# Patient Record
Sex: MALE | NOT HISPANIC OR LATINO | Employment: FULL TIME | URBAN - METROPOLITAN AREA
[De-identification: names, ages, dates, MRNs, and addresses within clinical notes are randomized per-mention and may not be internally consistent; named-entity substitution may affect disease eponyms.]

---

## 2022-06-30 ENCOUNTER — TELEPHONE (OUTPATIENT)
Dept: UROLOGY | Facility: CLINIC | Age: 32
End: 2022-06-30

## 2022-06-30 DIAGNOSIS — N46.9 MALE INFERTILITY: Primary | ICD-10-CM

## 2022-06-30 NOTE — TELEPHONE ENCOUNTER
[facility] Call Center    Phone Message    May a detailed message be left on voicemail: yes     Reason for Call: Order(s): Other:   Reason for requested: semen analysis  Date needed: before September 1  Provider name: Dereje      Action Taken: Other: Urology    Travel Screening: Not Applicable

## 2022-06-30 NOTE — TELEPHONE ENCOUNTER
Called patient and talked to him about the information below.     Before an appointment the patient should have the following competed.     Semen Analysis (Strict Morph). For the sample the patient needs to abstain from ejaculation for 2-5 days prior, and the sample should be collected in clinic. Ejaculating too frequently can deplete the count whereas abstaining for long periods of time can decrease the number of live sperm. No lubrication, powders, saliva, or intercourse can be used. Partners can be in the room and help produce the sample.   If this has already been completed it can be faxed to ([phone]).    [facility]  [facility]  [address]  [address] [place], [place] [address] [phone]      Lab work - Testosterone free and total, FSH (follicle stimulating hormone), and Estradiol ultra sensitive. This lab work can be done at any [facility] but must be drawn early in the morning.       Any previous OB/GYN office notes, urology office notes, or operative reports (varicocele, and testicular surgery or treatment). This can be faxed to ([phone]).

## 2022-08-04 NOTE — TELEPHONE ENCOUNTER
Sent letter with detailed information on scheduling for semen analysis and blood draw labs prior to visit on 9/14/22 with Dr. Reyez.     Abbey Santana  In-Clinic Visit Facilitator

## 2022-08-17 ENCOUNTER — LAB (OUTPATIENT)
Dept: LAB | Facility: CLINIC | Age: 32
End: 2022-08-17
Payer: COMMERCIAL

## 2022-08-17 DIAGNOSIS — N46.9 MALE INFERTILITY: ICD-10-CM

## 2022-08-17 LAB — FSH SERPL IRP2-ACNC: 4.1 MIU/ML (ref 1.5–12.4)

## 2022-08-17 PROCEDURE — 36415 COLL VENOUS BLD VENIPUNCTURE: CPT

## 2022-08-17 PROCEDURE — 84270 ASSAY OF SEX HORMONE GLOBUL: CPT

## 2022-08-17 PROCEDURE — 83001 ASSAY OF GONADOTROPIN (FSH): CPT

## 2022-08-17 PROCEDURE — 82670 ASSAY OF TOTAL ESTRADIOL: CPT

## 2022-08-17 PROCEDURE — 84403 ASSAY OF TOTAL TESTOSTERONE: CPT

## 2022-08-18 LAB — SHBG SERPL-SCNC: 13 NMOL/L (ref 11–80)

## 2022-08-19 LAB
TESTOST FREE SERPL-MCNC: 7.44 NG/DL
TESTOST SERPL-MCNC: 251 NG/DL (ref 240–950)

## 2022-08-31 ENCOUNTER — LAB (OUTPATIENT)
Dept: LAB | Facility: CLINIC | Age: 32
End: 2022-08-31
Payer: COMMERCIAL

## 2022-08-31 DIAGNOSIS — Z31.41 FERTILITY TESTING: Primary | ICD-10-CM

## 2022-08-31 DIAGNOSIS — Z31.41 FERTILITY TESTING: ICD-10-CM

## 2022-08-31 LAB — ESTRADIOL SERPL HS-MCNC: 13 PG/ML (ref 10–40)

## 2022-08-31 PROCEDURE — 89322 SEMEN ANAL STRICT CRITERIA: CPT

## 2022-09-01 LAB
ABNORMAL SPERM MORPHOLOGY: 100
ABSTINENCE DAYS: 4 DAYS (ref 2–7)
AGGLUTINATION: NO
ANALYSIS TEMP - CENTIGRADE: 22 CENTIGRADE
COLLECTION METHOD: ABNORMAL
COLLECTION SITE: ABNORMAL
CONSENT TO RELEASE TO PARTNER: YES
DAL- RECEIVED TIME: ABNORMAL
HEAD DEFECT: 100 %
IMMOTILE: 81 %
LIQUEFIED: YES
MIDPIECE DEFECT: 33 %
NON-PROGRESSIVE MOTILITY: 8 %
NORMAL SPERM MORPHOLOGY: 0 % NORMAL FORMS
PROGRESSIVE MOTILITY: 11 %
ROUND CELLS: 0.4 MILLION/ML
SPECIMEN PH: 7.2 PH
SPECIMEN VOLUME: 4.1 ML
SPERM CONCENTRATION: 0.8 MILLION/ML
TAIL DEFECT: 12 %
TIME OF ANALYSIS: ABNORMAL
TOTAL PROGRESSIVE MOTILE NUMBER: 0.33 MILLION
TOTAL SPERM NUMBER: 3 MILLION
VISCOUS: NO
VITALITY: ABNORMAL

## 2022-09-14 ENCOUNTER — OFFICE VISIT (OUTPATIENT)
Dept: UROLOGY | Facility: CLINIC | Age: 32
End: 2022-09-14
Payer: COMMERCIAL

## 2022-09-14 DIAGNOSIS — N46.9 MALE INFERTILITY: Primary | ICD-10-CM

## 2022-09-14 LAB
LH SERPL-ACNC: 5.4 MIU/ML (ref 1.7–8.6)
PROLACTIN SERPL 3RD IS-MCNC: 6 NG/ML (ref 4–15)

## 2022-09-14 PROCEDURE — 84270 ASSAY OF SEX HORMONE GLOBUL: CPT | Performed by: UROLOGY

## 2022-09-14 PROCEDURE — 36415 COLL VENOUS BLD VENIPUNCTURE: CPT | Performed by: UROLOGY

## 2022-09-14 PROCEDURE — 84403 ASSAY OF TOTAL TESTOSTERONE: CPT | Performed by: UROLOGY

## 2022-09-14 PROCEDURE — 84146 ASSAY OF PROLACTIN: CPT | Performed by: UROLOGY

## 2022-09-14 PROCEDURE — 83002 ASSAY OF GONADOTROPIN (LH): CPT | Performed by: UROLOGY

## 2022-09-14 PROCEDURE — 99213 OFFICE O/P EST LOW 20 MIN: CPT | Performed by: UROLOGY

## 2022-09-14 NOTE — PROGRESS NOTES
Dear Helena Rocha,  It was my pleasure to see Mr. Christopher Tavarez, a 32 year old male here in consultation today for fertility evaluation.  His spouse is Micki Tavarez age 28 (4/14/94).    This couple has been attempting to conceive for over 18 mos. They have one previous pregnancy together: they have a 3yo child, conceived easily.  Pregnancies with other partners:none  They have tried timed intercourse. They have not tried IUI or IVF.    Female factors suspected: taking thyroid replacment.  Cycles are regular.    PAST MEDICAL HISTORY:    No chronic medical problems    PAST SURG HISTORY  ACL in college.    Medications as of 9/14/2022:  No prescription medications at this time.     ALLERGY:     Allergies   Allergen Reactions     Grape Concord Os [Flavoring Agent]        SOCIAL HISTORY:  . Occupation:  support.  No alcohol abuse, no tobacco use.     REVIEW OF SYSTEMS:  Significant for feeling well at present. Minor cough today.      Denies erectile dysfunction, ejaculatory problems, testicular pain. No vision or smell deficits, no chronic sinus or respiratory infections. No recent febrile illness, weight loss. No heat or cold intolerance, gynecomastia, or other endocrine complaints.    Otherwise, no constitutional, eye, ENT, heart, lung, GI , musculoskeletal, skin, neurologic, psychiatric, or hematologic complaints.    GONADOTOXIN EXPOSURE: Unremarkable. Otherwise negative for marijuana, heat, chemicals, pesticides, heavy metals, steroids, chemotherapy or radiation.    GENERAL PHYSICAL EXAM  Constitutional: No acute distress. Well nourished.   PSYCH: normal mood and affect.  NEURO: normal gait, no focal deficits.   EYES: anicteric, EOMI, PERR  CARDIOPULMONARY: breathing non-labored, pulse regular, no peripheral edema.  GI: Abdomen soft, non-tender, no surgical scars, no organomegaly.  MUSCULOSKELETAL: normal limb proportions, no muscle wasting, no contractures.  SKIN: Normal virilized hair  distribution, no lesions, warts or rashes over genitalia, abdomen extremities or face.  HEME/LYMPH: no ecchymosis, no lymphadenopathy in groin, no lymphedema.     EXAM:  Phallus circumcised, meatus adequate, no plaques palpated.   Left testis descended , size is 12 cc , consistency is slightly soft . No intra-testicular masses.   Right testis descended , size is 12 cc , consistency is slightly soft. No intra-testicular masses.   Epididymes present, non-tender, not enlarged.   Left cord: Vas present. No obvious varicocele noted.  Right cord: Vas present. No obvious varicocele noted.   Checked standing and supine, no obvious varicoceles.    Rectal exam deferred.     Labs/imaging reviewed by me today:  Semen analysis x 3 show oligo-asthenoteratoospermia   3 lab tests.      Component      Latest Ref Rng & Units 8/31/2022   Collection Method       Masturbation   Collection Site       ABY   Time of Receipt       9:44 AM   Time of Analysis       9:59 AM   Analysis Temp - Centigrade      centigrade 22.0   Abstinence days      2 - 7 days 4   Liquefied      Yes Yes   Viscous      No No   Agglutination      No No   pH      >=7.2 pH 7.2   Volume      >=1.4 mL 4.1   Concentration      >=16.0 million/ml 0.8 (L)   Total Number      >=39.0 million 3.0 (L)   Progressive motility      >=30 % 11 (L)   Non-progressive motility      % 8   Immotile      % 81   Total Progressive Motile      million 0.33   Vitality          Normal Sperm      >=4 % Normal forms 0 (L)   Abnormal Sperm       100   Head Defect      % 100   Midpiece Defect      % 33   Tail Defect      % 12   Round Cells      <=2.0 million/ml 0.4   Consent to Release to Partner       Yes     Component      Latest Ref Rng & Units 8/17/2022   Free Testosterone Calculated      ng/dL 7.44   Testosterone Total      240 - 950 ng/dL 251   Estradiol Ultrasensitive      10 - 40 pg/mL 13   FSH      1.5 - 12.4 mIU/mL 4.1   Sex Hormone Binding Globulin      11 - 80 nmol/L 13     Semen  "Analysis 6/16/22:  4ml,  pH 8, count <2M/mL    Semen Analysis 4/22/22:  3.5ml,  pH 8, <2M/cc, rare motile sperm seen.    Testosterone low normal normal free testosterone, normal FSH, oligo-asthenoteratoospermia.    ASSESSMENT:    Fertility Testing.    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  Oligo-asthenoteratoospermia.    No varicocele noted    PLAN:    Hormonal panel today to recheck testosterone, LH, PRL.    Will consider Clomid.    Discussed that an option from the male side is to use a prescription medication like Clomid to fine tune his hormones some, and hopefully improve sperm production.  Discussed that Clomid is marketed as a female fertility medication but is used commonly \"off-label\" for treating men as well.  Clomid helps the body increase stimulation to the testicles to try to improve sperm production.  Results are variable (sometimes doesn't work at all) and results take at least 3-4 months on the medication to see possible improvement in semen analysis parameters, due to the slow rate of sperm production.  Possible side effects discussed: decreased libido, breast tenderness, or water weight gain.         Discussed OTC supplements to consider taking    I will contact him with results and plan/options.    Discussed ART options, especially IVF.      Thank-you for allowing me to care for your patient.  Sincerely,    Yair Reyez MD      CC: Bot          Additional Coding Information:    Problems:  3 -- one acute uncomplicated illness or injury    Data Reviewed  3 or more studies reviewed, as listed above     Tests ordered/pending: 3 labs ordered.    Notes from other providers reviewed: N/A     Level of risk:  3 -- low risk (e.g., OTC medication or observation, minor surgery without risks)    Time spent:  34 minutes spent on the date of the encounter doing chart review, history and exam, documentation and further activities per the note           "

## 2022-09-14 NOTE — PROGRESS NOTES
Christopher Tavarez's goals for this visit include:   Chief Complaint   Patient presents with     New Patient     Infertility      Infertililty       He requests these members of his care team be copied on today's visit information: ***    PCP: No primary care provider on file.    Referring Provider:  Referred Self, MD  No address on file    There were no vitals taken for this visit.    Do you need any medication refills at today's visit?     Micki Salvador LPN on 9/14/2022 at 11:12 AM

## 2022-09-15 LAB — SHBG SERPL-SCNC: 12 NMOL/L (ref 11–80)

## 2022-09-16 LAB
TESTOST FREE SERPL-MCNC: 1.99 NG/DL
TESTOST SERPL-MCNC: 69 NG/DL (ref 240–950)

## 2022-09-19 NOTE — RESULT ENCOUNTER NOTE
"Lili-    I recommend [facility] for karyotype and Y-chromosome microdeletion testing for him please.  -Thank You, OSMANI    ______________________________________      Dear Christopher,     Here are your recent results.     Recheck of testosterone remains low.  The newest testosterone is lower than the previous, likely because it was drawn in the afternoon. ( Testosterone is highest in the early morning).    A genetic cause of the low sperm count is less likely as you have fathered a child before, but I would recommend a blood draw for a genetic screen for karyotype and Y-chromosome microdeletion testing.  This could explain the low sperm count and help prevent issues with future fertility treatments.    An option from the male side is to use a prescription medication like Clomid to fine tune your hormones some, and hopefully improve sperm production.  Clomid is marketed as a female fertility medication but is used commonly \"off-label\" for treating men as well.  Clomid helps the body increase stimulation to the testicle to try to improve sperm production.  Results are variable (sometimes doesn't work at all) and results take at least 3-4 months on the medication to see possible improvement in semen analysis parameters, due to the slow rate of sperm production.  Side effects are uncommon but can include decreased libido, breast tenderness, or water weight gain.  Let me know if you are interested in trying this.      Otherwise I recommend pursing IVF with a female fertility specialist.    There are 3 main clinics in the [facility] that do all aspects of advanced female infertility care:     1. [facility] [place].  [email or web address].  Office in [place].     2. [email or web address]  Offices in [facility].        3. [email or web address].  Office in [facility].         Please let us know if you have any questions or concerns.     Aric DISLA "

## 2022-09-20 ENCOUNTER — MYC MEDICAL ADVICE (OUTPATIENT)
Dept: UROLOGY | Facility: CLINIC | Age: 32
End: 2022-09-20

## 2022-10-03 ENCOUNTER — HEALTH MAINTENANCE LETTER (OUTPATIENT)
Age: 32
End: 2022-10-03

## 2023-06-26 PROCEDURE — 88305 TISSUE EXAM BY PATHOLOGIST: CPT | Performed by: PATHOLOGY

## 2023-06-27 ENCOUNTER — LAB REQUISITION (OUTPATIENT)
Dept: LAB | Facility: CLINIC | Age: 33
End: 2023-06-27

## 2023-06-27 DIAGNOSIS — K62.89 OTHER SPECIFIED DISEASES OF ANUS AND RECTUM: ICD-10-CM

## 2023-06-27 DIAGNOSIS — K62.5 HEMORRHAGE OF ANUS AND RECTUM: ICD-10-CM

## 2023-06-27 DIAGNOSIS — R10.84 GENERALIZED ABDOMINAL PAIN: ICD-10-CM

## 2023-06-27 DIAGNOSIS — D12.8 BENIGN NEOPLASM OF RECTUM: ICD-10-CM

## 2023-06-28 LAB
PATH REPORT.COMMENTS IMP SPEC: NORMAL
PATH REPORT.COMMENTS IMP SPEC: NORMAL
PATH REPORT.FINAL DX SPEC: NORMAL
PATH REPORT.GROSS SPEC: NORMAL
PATH REPORT.MICROSCOPIC SPEC OTHER STN: NORMAL
PATH REPORT.RELEVANT HX SPEC: NORMAL
PHOTO IMAGE: NORMAL

## 2023-10-22 ENCOUNTER — HEALTH MAINTENANCE LETTER (OUTPATIENT)
Age: 33
End: 2023-10-22

## 2024-12-15 ENCOUNTER — HEALTH MAINTENANCE LETTER (OUTPATIENT)
Age: 34
End: 2024-12-15